# Patient Record
Sex: FEMALE | Race: WHITE | NOT HISPANIC OR LATINO | Employment: FULL TIME | RURAL
[De-identification: names, ages, dates, MRNs, and addresses within clinical notes are randomized per-mention and may not be internally consistent; named-entity substitution may affect disease eponyms.]

---

## 2022-05-26 ENCOUNTER — HOSPITAL ENCOUNTER (EMERGENCY)
Facility: HOSPITAL | Age: 32
Discharge: HOME | End: 2022-05-26
Payer: MEDICAID

## 2022-05-26 VITALS
HEIGHT: 63 IN | WEIGHT: 143 LBS | OXYGEN SATURATION: 99 % | DIASTOLIC BLOOD PRESSURE: 86 MMHG | BODY MASS INDEX: 25.34 KG/M2 | RESPIRATION RATE: 17 BRPM | HEART RATE: 96 BPM | SYSTOLIC BLOOD PRESSURE: 115 MMHG

## 2022-05-26 DIAGNOSIS — M79.671 FOOT PAIN, RIGHT: ICD-10-CM

## 2022-05-26 DIAGNOSIS — S92.354A CLOSED NONDISPLACED FRACTURE OF FIFTH METATARSAL BONE OF RIGHT FOOT, INITIAL ENCOUNTER: Primary | ICD-10-CM

## 2022-05-26 PROCEDURE — 99283 EMERGENCY DEPT VISIT LOW MDM: CPT

## 2022-05-26 PROCEDURE — 99282 EMERGENCY DEPT VISIT SF MDM: CPT | Mod: ,,, | Performed by: NURSE PRACTITIONER

## 2022-05-26 PROCEDURE — 99282 PR EMERGENCY DEPT VISIT,LEVEL II: ICD-10-PCS | Mod: ,,, | Performed by: NURSE PRACTITIONER

## 2022-05-26 RX ORDER — QUETIAPINE FUMARATE 100 MG/1
TABLET, FILM COATED ORAL
COMMUNITY
End: 2023-09-20

## 2022-05-26 RX ORDER — DULOXETIN HYDROCHLORIDE 60 MG/1
CAPSULE, DELAYED RELEASE ORAL
COMMUNITY
End: 2023-09-20

## 2022-05-26 RX ORDER — ROPINIROLE 2 MG/1
2 TABLET, FILM COATED ORAL NIGHTLY
COMMUNITY
End: 2023-09-20

## 2022-05-26 NOTE — DISCHARGE INSTRUCTIONS
Wear splint. Use crutches as directed. Elevate Right leg.   Apply ice every 2-3 hours.   May take Tylenol or Ibuprofen for pain.   Follow up with Dr. Costa in clinic next week. Call for appointment.

## 2022-05-26 NOTE — ED NOTES
Medium walking boot applied to Right foot. Crutches fitted and patient demonstrated proper use with ambulation.

## 2022-05-26 NOTE — Clinical Note
"Zahra Pateljanel Cifuentes was seen and treated in our emergency department on 5/26/2022.  She may return to work on 05/31/2022.  If ok with DR. Costa     If you have any questions or concerns, please don't hesitate to call.      SEGRIO Diallo"

## 2022-05-26 NOTE — ED PROVIDER NOTES
"Encounter Date: 5/26/2022       History     Chief Complaint   Patient presents with    Foot Injury     31 year old  female presents to ED with right foot pain. Pt reports approximately 30-45 minutes ago she went to stand up and fell, her right foot "rolled in". Denies ankle pain. + pulses right foot. + swelling right lateral foot, TTP.       Injury   The incident occurred just prior to arrival. The injury mechanism was a fall. She came to the ER via ambulatory. There is an injury to the right foot. The pain is at a severity of 7/10.     Review of patient's allergies indicates:   Allergen Reactions    Penicillins      Other reaction(s): Unknown     Past Medical History:   Diagnosis Date    Bipolar disorder, unspecified      Past Surgical History:   Procedure Laterality Date    TUBAL LIGATION Bilateral      History reviewed. No pertinent family history.  Social History     Tobacco Use    Smoking status: Current Every Day Smoker     Packs/day: 1.00     Types: Cigarettes    Smokeless tobacco: Never Used   Substance Use Topics    Alcohol use: Not Currently    Drug use: Yes     Types: Marijuana     Review of Systems   Constitutional: Negative.    Respiratory: Negative.    Cardiovascular: Negative.    Musculoskeletal: Positive for joint swelling.       Physical Exam     Initial Vitals [05/26/22 1742]   BP Pulse Resp Temp SpO2   115/86 96 17 -- 99 %      MAP       --         Physical Exam    Constitutional: She appears well-developed and well-nourished. No distress.   HENT:   Head: Normocephalic and atraumatic.   Neck:   Normal range of motion.  Cardiovascular: Normal rate, regular rhythm, normal heart sounds and intact distal pulses.   Pulmonary/Chest: Breath sounds normal. No respiratory distress.   Musculoskeletal:      Cervical back: Normal range of motion.      Right foot: Normal capillary refill. Swelling and tenderness present. Normal pulse.     Neurological: She is alert and oriented to person, " place, and time. She has normal strength.   Skin: Skin is warm and dry. Capillary refill takes less than 2 seconds.   Psychiatric: She has a normal mood and affect.         Medical Screening Exam   See Full Note    ED Course   Procedures  Labs Reviewed - No data to display       Imaging Results          X-Ray Foot Complete Right (Final result)  Result time 05/26/22 17:53:47    Final result by Emmett Turcios DO (05/26/22 17:53:47)                 Impression:      Acute nondisplaced fracture base of the 5th metatarsal      Electronically signed by: Emmett Turcios  Date:    05/26/2022  Time:    17:53             Narrative:    EXAMINATION:  XR FOOT COMPLETE 3 VIEW RIGHT    CLINICAL HISTORY:  Pain in right foot    TECHNIQUE:  XR FOOT COMPLETE 3 VIEW RIGHT    COMPARISON:  Comparisons were reviewed, if available.    FINDINGS:  Acute nondisplaced fracture base of the 5th metatarsal                                 Medications - No data to display              ED Course as of 05/26/22 1812   Thu May 26, 2022   1805 Discussed care with Dr. Costa ortho on call. Will place in boot and have follow up in clinic next week.  [CG]      ED Course User Index  [CG] SERGIO Diallo          Clinical Impression:   Final diagnoses:  [M79.671] Foot pain, right  [S92.354A] Closed nondisplaced fracture of fifth metatarsal bone of right foot, initial encounter (Primary)          ED Disposition Condition    Discharge Stable        ED Prescriptions     None        Follow-up Information     Follow up With Specialties Details Why Contact Info    Estuardo Costa MD Orthopedic Surgery Go in 3 days  [address]  [address]  Estuardo Costa Md, Orthopedic & Sports Medicine, [facility] [address]  [phone]             SERGIO Diallo  05/26/22 1813

## 2022-05-26 NOTE — ED TRIAGE NOTES
Presents to ER with reports of Right foot injury. Patient states she went to stand up and foot turned in. Knot and edema to outer foot noted. Ice pack place on triage.

## 2023-09-20 ENCOUNTER — HOSPITAL ENCOUNTER (EMERGENCY)
Facility: HOSPITAL | Age: 33
Discharge: HOME | End: 2023-09-20
Payer: MEDICAID

## 2023-09-20 VITALS
OXYGEN SATURATION: 100 % | SYSTOLIC BLOOD PRESSURE: 140 MMHG | HEART RATE: 91 BPM | RESPIRATION RATE: 20 BRPM | WEIGHT: 121 LBS | TEMPERATURE: 99 F | BODY MASS INDEX: 21.44 KG/M2 | HEIGHT: 63 IN | DIASTOLIC BLOOD PRESSURE: 92 MMHG

## 2023-09-20 DIAGNOSIS — N30.00 ACUTE CYSTITIS WITHOUT HEMATURIA: ICD-10-CM

## 2023-09-20 DIAGNOSIS — B96.89 BV (BACTERIAL VAGINOSIS): Primary | ICD-10-CM

## 2023-09-20 DIAGNOSIS — N76.0 BV (BACTERIAL VAGINOSIS): Primary | ICD-10-CM

## 2023-09-20 DIAGNOSIS — Z20.2 EXPOSURE TO STD: ICD-10-CM

## 2023-09-20 DIAGNOSIS — R03.0 ELEVATED BLOOD PRESSURE READING WITHOUT DIAGNOSIS OF HYPERTENSION: ICD-10-CM

## 2023-09-20 LAB
BACTERIA #/AREA URNS HPF: ABNORMAL /HPF
BACTERIA VAG QL WET PREP: ABNORMAL /HPF
BILIRUB UR QL STRIP: NEGATIVE
CLARITY UR: ABNORMAL
CLUE CELLS VAG QL WET PREP: ABNORMAL /HPF
COLOR UR: YELLOW
GLUCOSE UR STRIP-MCNC: NEGATIVE MG/DL
HCG UR QL IA.RAPID: NEGATIVE
KETONES UR STRIP-SCNC: NEGATIVE MG/DL
LEUKOCYTE ESTERASE UR QL STRIP: ABNORMAL
NITRITE UR QL STRIP: NEGATIVE
PH UR STRIP: 7 PH UNITS
PROT UR QL STRIP: NEGATIVE
RBC # UR STRIP: NEGATIVE /UL
RBC #/AREA URNS HPF: ABNORMAL /HPF
RBC #/AREA VAG WET PREP: ABNORMAL /HPF
SP GR UR STRIP: 1.01
SQUAMOUS #/AREA URNS LPF: ABNORMAL /LPF
SQUAMOUS EPITHELIALS WET WOUNT, GENITAL: ABNORMAL /HPF
T VAGINALIS VAG QL WET PREP: ABNORMAL /HPF
UROBILINOGEN UR STRIP-ACNC: 0.2 MG/DL
WBC #/AREA URNS HPF: ABNORMAL /HPF
WBC CLUMPS WET MOUNT, GENITAL: ABNORMAL /HPF
WBC VAG QL WET PREP: ABNORMAL /HPF
YEAST VAG QL WET PREP: ABNORMAL /HPF

## 2023-09-20 PROCEDURE — 99284 EMERGENCY DEPT VISIT MOD MDM: CPT

## 2023-09-20 PROCEDURE — 99284 EMERGENCY DEPT VISIT MOD MDM: CPT | Mod: ,,, | Performed by: NURSE PRACTITIONER

## 2023-09-20 PROCEDURE — 87491 CHLMYD TRACH DNA AMP PROBE: CPT | Performed by: NURSE PRACTITIONER

## 2023-09-20 PROCEDURE — 96372 THER/PROPH/DIAG INJ SC/IM: CPT | Performed by: NURSE PRACTITIONER

## 2023-09-20 PROCEDURE — 87086 URINE CULTURE/COLONY COUNT: CPT | Performed by: NURSE PRACTITIONER

## 2023-09-20 PROCEDURE — 81025 URINE PREGNANCY TEST: CPT | Performed by: NURSE PRACTITIONER

## 2023-09-20 PROCEDURE — 87210 SMEAR WET MOUNT SALINE/INK: CPT | Performed by: NURSE PRACTITIONER

## 2023-09-20 PROCEDURE — 81001 URINALYSIS AUTO W/SCOPE: CPT | Performed by: NURSE PRACTITIONER

## 2023-09-20 PROCEDURE — 99284 PR EMERGENCY DEPT VISIT,LEVEL IV: ICD-10-PCS | Mod: ,,, | Performed by: NURSE PRACTITIONER

## 2023-09-20 PROCEDURE — 87591 N.GONORRHOEAE DNA AMP PROB: CPT | Performed by: NURSE PRACTITIONER

## 2023-09-20 PROCEDURE — 63600175 PHARM REV CODE 636 W HCPCS: Performed by: NURSE PRACTITIONER

## 2023-09-20 RX ORDER — CEFTRIAXONE 1 G/1
1 INJECTION, POWDER, FOR SOLUTION INTRAMUSCULAR; INTRAVENOUS
Status: COMPLETED | OUTPATIENT
Start: 2023-09-20 | End: 2023-09-20

## 2023-09-20 RX ORDER — CEPHALEXIN 500 MG/1
500 CAPSULE ORAL EVERY 12 HOURS
Qty: 14 CAPSULE | Refills: 0 | Status: SHIPPED | OUTPATIENT
Start: 2023-09-20 | End: 2023-09-27

## 2023-09-20 RX ADMIN — CEFTRIAXONE 1 G: 1 INJECTION, POWDER, FOR SOLUTION INTRAMUSCULAR; INTRAVENOUS at 02:09

## 2023-09-20 NOTE — Clinical Note
"Zahra Pateljanel Cifuentes was seen and treated in our emergency department on 9/20/2023.  She may return to work on 09/21/2023.       If you have any questions or concerns, please don't hesitate to call.      Jen Goldman, MANDYP"

## 2023-09-20 NOTE — DISCHARGE INSTRUCTIONS
Take all of your antibiotics as prescribed. No sexual activity until antibiotics have been completed and partner has been tested for sexually transmitted disease. Follow up with your primary care provider or health department for further STD testing and for retesting as needed. Use condoms with every sexual encounter. Return to the ER for worsening symptoms.     The examination and treatment you have received in the Emergency Department today have been rendered on an emergency basis only and are not intended to be a substitute for an effort to provide complete medical care. You should contact your follow-up physician as it is important that you let him or her check you and report any new or remaining problems since it is impossible to recognize and treat all elements of an injury or illness in a single emergency care center visit.

## 2023-09-20 NOTE — ED NOTES
Patient has no signs of adverse reaction noted to abt, discharge instructions given with voiced understanding.PATRICK

## 2023-09-20 NOTE — ED PROVIDER NOTES
Encounter Date: 9/20/2023       History     Chief Complaint   Patient presents with    Vaginal Discharge    Vaginal Itching     32 yo WF presents with c/o vaginal irritation and discharge since having unprotected sex on Sunday.     The history is provided by the patient.     Review of patient's allergies indicates:   Allergen Reactions    Penicillins      Other reaction(s): Unknown     Past Medical History:   Diagnosis Date    Bipolar disorder, unspecified      Past Surgical History:   Procedure Laterality Date    DENTAL SURGERY      TUBAL LIGATION Bilateral      History reviewed. No pertinent family history.  Social History     Tobacco Use    Smoking status: Every Day     Current packs/day: 1.00     Types: Cigarettes    Smokeless tobacco: Never   Substance Use Topics    Alcohol use: Not Currently    Drug use: Not Currently     Review of Systems   Constitutional:  Negative for appetite change, chills and fever.   Respiratory:  Negative for cough, shortness of breath and wheezing.    Gastrointestinal:  Negative for abdominal pain, diarrhea, nausea and vomiting.   Genitourinary:  Positive for pelvic pain and vaginal discharge.   Musculoskeletal:  Negative for back pain.   Neurological:  Negative for dizziness, weakness and headaches.   Psychiatric/Behavioral:  Negative for confusion.    All other systems reviewed and are negative.      Physical Exam     Initial Vitals [09/20/23 0145]   BP Pulse Resp Temp SpO2   (!) 148/91 97 20 99.5 °F (37.5 °C) 100 %      MAP       --         Physical Exam    Nursing note and vitals reviewed.  Constitutional: She appears well-developed and well-nourished.   HENT:   Head: Normocephalic and atraumatic.   Right Ear: External ear normal.   Left Ear: External ear normal.   Nose: Nose normal.   Mouth/Throat: Oropharynx is clear and moist.   Eyes: EOM are normal. Pupils are equal, round, and reactive to light.   Neck: Neck supple.   Normal range of motion.  Cardiovascular:  Normal rate,  regular rhythm and normal heart sounds.           Pulmonary/Chest: Breath sounds normal.   Abdominal: Abdomen is soft. Bowel sounds are normal.   Genitourinary:    Vagina, uterus and rectum normal.   There is no rash, tenderness or lesion on the right labia. There is no rash, tenderness or lesion on the left labia. Cervix exhibits discharge. Cervix exhibits no motion tenderness and no friability. Right adnexum displays no tenderness. Left adnexum displays no tenderness.   Musculoskeletal:         General: Normal range of motion.      Cervical back: Normal range of motion and neck supple.     Neurological: She is alert and oriented to person, place, and time. She has normal strength. GCS score is 15. GCS eye subscore is 4. GCS verbal subscore is 5. GCS motor subscore is 6.   Skin: Skin is warm. Capillary refill takes less than 2 seconds.   Psychiatric: She has a normal mood and affect. Her behavior is normal. Judgment and thought content normal.         Medical Screening Exam   See Full Note    ED Course   Procedures  Labs Reviewed   WET PREP, GENITAL - Abnormal; Notable for the following components:       Result Value    WBC Wet Mount Few (*)     Bacteria Wet Mount Moderate (*)     Clue Cell Wet Mount Moderate (*)     Squamous Epithelials Wet Mount Rare (*)     All other components within normal limits   URINALYSIS, REFLEX TO URINE CULTURE - Abnormal; Notable for the following components:    Leukocytes, UA Large (*)     All other components within normal limits   URINALYSIS, MICROSCOPIC - Abnormal; Notable for the following components:    WBC, UA 15-25 (*)     Bacteria, UA Moderate (*)     Squamous Epithelial Cells, UA Few (*)     All other components within normal limits   HCG QUALITATIVE URINE - Normal   CHLAMYDIA/GONORRHOEAE(GC), PCR   CULTURE, URINE          Imaging Results    None          Medications   cefTRIAXone injection 1 g (has no administration in time range)     Medical Decision Making  34 yo WF  presents with c/o vaginal irritation and discharge since having unprotected sex on Sunday.   Reports that she is prone to BV. Has also had gonorrhea in the past and states this feels similar.   Vaginal exam performed, witnessed by DARLENE Almeida - no CMT or adnexal tenderness. Does have thick discharge from cervix.   Urinalysis with 15-25 WBC, moderate bacteria and large leukocytes.   Wet prep with moderate clue cells, moderate bacteria  GC Chlamydia pending  1g Rocephin given IM in ED  Discharged with rx for Keflex BID x 7 days which will treat UTI, BV and GC/Chlamydia if it comes back positive.   Patient educated on importance of condom use. Avoid sexual activity until she has finished abx. Partner should be checked prior to sexual activity.     Strict return and follow-up precautions have been given by me personally to the patient/family/caregiver(s).    Data Reviewed/Counseling: I have reviewed the patient's vital signs, nursing notes, and other relevant tests/information. I had a detailed discussion regarding the historical points, exam findings, and any diagnostic results supporting the discharge diagnosis. I also discussed the need for outpatient follow-up and the need to return to the ED if symptoms worsen or if there are any questions or concerns that arise at home.          Amount and/or Complexity of Data Reviewed  External Data Reviewed: notes.  Labs: ordered. Decision-making details documented in ED Course.    Risk  Prescription drug management.               ED Course as of 09/20/23 0216   Wed Sep 20, 2023   0204 Leukocytes, UA(!): Large [MJ]   0204 WBC, UA(!): 15-25 [MJ]   0204 Bacteria, UA(!): Moderate [MJ]   0212 WBC - Vaginal Screen(!): Few [MJ]   0212 Bacteria - Vaginal Screen(!): Moderate [MJ]   0212 Clue Cells, Wet Prep(!): Moderate [MJ]      ED Course User Index  [MJ] Jen Goldman, SERGIO                    Clinical Impression:   Final diagnoses:  [R03.0] Elevated blood pressure reading without  diagnosis of hypertension  [N30.00] Acute cystitis without hematuria  [N76.0, B96.89] BV (bacterial vaginosis) (Primary)  [Z20.2] Exposure to STD               Jen Goldman, [facility]  09/20/23 0216

## 2023-09-20 NOTE — ED TRIAGE NOTES
Pt presents to the ED with complaints of vaginal itching, white vaginal discharge and vaginal discomfort. Pt states she had sex with a hi on Sunday and ever since she has been have these symptoms. Pt states she is scared she may have a std.

## 2023-09-21 LAB
CHLAMYDIA BY PCR: NEGATIVE
N. GONORRHOEAE (GC) BY PCR: NEGATIVE

## 2023-09-22 LAB — UA COMPLETE W REFLEX CULTURE PNL UR: NORMAL

## 2023-11-02 ENCOUNTER — HOSPITAL ENCOUNTER (EMERGENCY)
Facility: HOSPITAL | Age: 33
Discharge: HOME | End: 2023-11-02
Payer: MEDICAID

## 2023-11-02 VITALS
HEIGHT: 62 IN | SYSTOLIC BLOOD PRESSURE: 114 MMHG | DIASTOLIC BLOOD PRESSURE: 68 MMHG | WEIGHT: 125 LBS | RESPIRATION RATE: 20 BRPM | HEART RATE: 84 BPM | BODY MASS INDEX: 23 KG/M2 | TEMPERATURE: 98 F | OXYGEN SATURATION: 99 %

## 2023-11-02 DIAGNOSIS — R51.9 SINUS HEADACHE: ICD-10-CM

## 2023-11-02 DIAGNOSIS — J32.9 SINUSITIS, UNSPECIFIED CHRONICITY, UNSPECIFIED LOCATION: ICD-10-CM

## 2023-11-02 DIAGNOSIS — J02.9 SORE THROAT: Primary | ICD-10-CM

## 2023-11-02 DIAGNOSIS — R05.9 COUGH, UNSPECIFIED TYPE: ICD-10-CM

## 2023-11-02 PROCEDURE — 99284 EMERGENCY DEPT VISIT MOD MDM: CPT

## 2023-11-02 PROCEDURE — 99284 EMERGENCY DEPT VISIT MOD MDM: CPT | Mod: ,,,

## 2023-11-02 PROCEDURE — 96372 THER/PROPH/DIAG INJ SC/IM: CPT

## 2023-11-02 PROCEDURE — 99284 PR EMERGENCY DEPT VISIT,LEVEL IV: ICD-10-PCS | Mod: ,,,

## 2023-11-02 PROCEDURE — 63600175 PHARM REV CODE 636 W HCPCS

## 2023-11-02 RX ORDER — AZITHROMYCIN 250 MG/1
TABLET, FILM COATED ORAL
Qty: 6 TABLET | Refills: 0 | Status: SHIPPED | OUTPATIENT
Start: 2023-11-02 | End: 2023-11-07

## 2023-11-02 RX ORDER — KETOROLAC TROMETHAMINE 30 MG/ML
30 INJECTION, SOLUTION INTRAMUSCULAR; INTRAVENOUS
Status: COMPLETED | OUTPATIENT
Start: 2023-11-02 | End: 2023-11-02

## 2023-11-02 RX ORDER — DEXAMETHASONE SODIUM PHOSPHATE 4 MG/ML
4 INJECTION, SOLUTION INTRA-ARTICULAR; INTRALESIONAL; INTRAMUSCULAR; INTRAVENOUS; SOFT TISSUE
Status: COMPLETED | OUTPATIENT
Start: 2023-11-02 | End: 2023-11-02

## 2023-11-02 RX ADMIN — KETOROLAC TROMETHAMINE 30 MG: 30 INJECTION, SOLUTION INTRAMUSCULAR; INTRAVENOUS at 09:11

## 2023-11-02 RX ADMIN — DEXAMETHASONE SODIUM PHOSPHATE 4 MG: 4 INJECTION, SOLUTION INTRA-ARTICULAR; INTRALESIONAL; INTRAMUSCULAR; INTRAVENOUS; SOFT TISSUE at 09:11

## 2023-11-02 NOTE — Clinical Note
"Zahra Pateljanel Cifuentes was seen and treated in our emergency department on 11/2/2023.  She may return to work on 11/03/2023.       If you have any questions or concerns, please don't hesitate to call.      Kenneth Walden, SERGIO"

## 2023-11-03 NOTE — DISCHARGE INSTRUCTIONS
Take medication as directed by the label on the bottle, follow up with local pcp if symptoms continue, return to the ER ONLY if symptoms worsen.     The examination and treatment you have received in the Emergency Department today have been rendered on an emergency basis only and are not intended to be a substitute for an effort to provide complete medical care. You should contact your follow-up physician as it is important that you let him or her check you and report any new or remaining problems since it is impossible to recognize and treat all elements of an injury or illness in a single emergency care center visit.

## 2023-11-03 NOTE — ED PROVIDER NOTES
Encounter Date: 11/2/2023       History     Chief Complaint   Patient presents with    Headache    Sore Throat     Patient is a 34 y/o  female who presents to the Emergency Department with c/o cold symptoms. Patient stated that her current symptoms have been ongoing for the past one week. Patient endorses cough, sore throat, but denies any fevers. Patient has not taken any over counter medication.     The history is provided by the patient.     Review of patient's allergies indicates:   Allergen Reactions    Penicillins      Other reaction(s): Unknown     Past Medical History:   Diagnosis Date    Bipolar disorder, unspecified      Past Surgical History:   Procedure Laterality Date    DENTAL SURGERY      TUBAL LIGATION Bilateral      History reviewed. No pertinent family history.  Social History     Tobacco Use    Smoking status: Every Day     Current packs/day: 1.00     Types: Cigarettes    Smokeless tobacco: Never   Substance Use Topics    Alcohol use: Not Currently    Drug use: Not Currently     Review of Systems   Constitutional: Negative.    HENT:  Positive for congestion and sore throat.    Eyes: Negative.    Respiratory:  Positive for cough.    Cardiovascular: Negative.    Gastrointestinal: Negative.    Endocrine: Negative.    Genitourinary: Negative.    Musculoskeletal: Negative.    Skin: Negative.    Allergic/Immunologic: Negative.    Neurological:  Positive for headaches.   Hematological: Negative.    Psychiatric/Behavioral: Negative.         Physical Exam     Initial Vitals   BP Pulse Resp Temp SpO2   11/02/23 2042 11/02/23 2047 11/02/23 2042 11/02/23 2042 11/02/23 2042   128/73 95 (!) 90 98 °F (36.7 °C) 99 %      MAP       --                Physical Exam    Nursing note and vitals reviewed.  Constitutional: Vital signs are normal. She appears well-developed and well-nourished. She is not diaphoretic. She is cooperative.  Non-toxic appearance. She has a sickly appearance. She does not appear ill.  No distress.   HENT:   Head: Normocephalic and atraumatic.   Right Ear: Hearing, external ear and ear canal normal. Tympanic membrane is bulging.   Left Ear: Hearing, external ear and ear canal normal. Tympanic membrane is bulging. No decreased hearing is noted.   Nose: Nose normal. Right sinus exhibits no maxillary sinus tenderness and no frontal sinus tenderness. Left sinus exhibits no maxillary sinus tenderness and no frontal sinus tenderness.   Mouth/Throat: Uvula is midline and mucous membranes are normal. Posterior oropharyngeal edema and posterior oropharyngeal erythema present.   Neck: Trachea normal and phonation normal. Neck supple. No thyroid mass and no thyromegaly present.   Normal range of motion.   Full passive range of motion without pain.     Cardiovascular:  Normal rate, regular rhythm, S1 normal, S2 normal, normal heart sounds, intact distal pulses and normal pulses.     Exam reveals no gallop, no S3, no S4, no distant heart sounds and no friction rub.       No murmur heard.  No systolic murmur is present.  No diastolic murmur is present.  Pulmonary/Chest: Effort normal and breath sounds normal.   Musculoskeletal:      Cervical back: Full passive range of motion without pain, normal range of motion and neck supple.     Lymphadenopathy:     She has no cervical adenopathy.     She has no axillary adenopathy.   Neurological: She is alert and oriented to person, place, and time. She has normal strength and normal reflexes. She displays normal reflexes. No cranial nerve deficit or sensory deficit. She displays a negative Romberg sign. GCS eye subscore is 4. GCS verbal subscore is 5. GCS motor subscore is 6.   Skin: Skin is warm, dry and intact. Capillary refill takes less than 2 seconds. No rash noted.   Psychiatric: She has a normal mood and affect. Her speech is normal and behavior is normal. Judgment and thought content normal. Cognition and memory are normal.         Medical Screening Exam   See  Full Note    ED Course   Procedures  Labs Reviewed - No data to display       Imaging Results    None          Medications   dexAMETHasone injection 4 mg (has no administration in time range)   ketorolac injection 30 mg (has no administration in time range)     Medical Decision Making  Patient is a 32 y/o  female who presents to the Emergency Department with c/o cold symptoms. Patient stated that her current symptoms have been ongoing for the past one week. Patient endorses cough, sore throat, but denies any fevers. Patient has not taken any over counter medication.     The history is provided by the patient.       Risk  Prescription drug management.  Risk Details: Pregnancy risk while taking PO antibiotics                ED Course as of 11/02/23 2058   Thu Nov 02, 2023 2056 Discharge instructions given along with strict return precautions, patient verbalizes understanding.   [AC]      ED Course User Index  [AC] Kenneth Walden FNP                    Clinical Impression:   Final diagnoses:  [J02.9] Sore throat (Primary)  [R51.9] Sinus headache  [J32.9] Sinusitis, unspecified chronicity, unspecified location  [R05.9] Cough, unspecified type        ED Disposition Condition    Discharge Stable          ED Prescriptions       Medication Sig Dispense Start Date End Date Auth. Provider    azithromycin (Z-HARINI) 250 MG tablet Take 2 tablets by mouth on day 1; Take 1 tablet by mouth on days 2-5 6 tablet 11/2/2023 11/7/2023 Kenneth Walden FNP          Follow-up Information       Follow up With Specialties Details Why Contact Info    Miguel Alanis NP Internal Medicine  As needed, If symptoms worsen [address] [place] [id number]  [phone]               Kenneth Walden FNP  11/02/23 2057       Kenneth Walden FNP  11/02/23 2058       Kenneth Wladen FNP  11/02/23 2058

## 2023-12-11 ENCOUNTER — HOSPITAL ENCOUNTER (EMERGENCY)
Facility: HOSPITAL | Age: 33
Discharge: HOME | End: 2023-12-11
Payer: MEDICAID

## 2023-12-11 VITALS
SYSTOLIC BLOOD PRESSURE: 112 MMHG | BODY MASS INDEX: 23 KG/M2 | WEIGHT: 125 LBS | HEART RATE: 77 BPM | TEMPERATURE: 98 F | DIASTOLIC BLOOD PRESSURE: 72 MMHG | HEIGHT: 62 IN | OXYGEN SATURATION: 99 % | RESPIRATION RATE: 16 BRPM

## 2023-12-11 DIAGNOSIS — R07.9 CHEST PAIN, UNSPECIFIED TYPE: Primary | ICD-10-CM

## 2023-12-11 DIAGNOSIS — J06.9 VIRAL URI WITH COUGH: ICD-10-CM

## 2023-12-11 DIAGNOSIS — R06.02 SHORTNESS OF BREATH: ICD-10-CM

## 2023-12-11 DIAGNOSIS — R07.9 CHEST PAIN: ICD-10-CM

## 2023-12-11 LAB
ALBUMIN SERPL BCP-MCNC: 3.8 G/DL (ref 3.5–5)
ALBUMIN/GLOB SERPL: 0.9 {RATIO}
ALP SERPL-CCNC: 69 U/L (ref 37–98)
ALT SERPL W P-5'-P-CCNC: 20 U/L (ref 13–56)
AMPHET UR QL SCN: NEGATIVE
ANION GAP SERPL CALCULATED.3IONS-SCNC: 11 MMOL/L (ref 7–16)
AST SERPL W P-5'-P-CCNC: 20 U/L (ref 15–37)
BACTERIA #/AREA URNS HPF: ABNORMAL /HPF
BARBITURATES UR QL SCN: NEGATIVE
BASOPHILS # BLD AUTO: 0.02 K/UL (ref 0–0.2)
BASOPHILS NFR BLD AUTO: 0.3 % (ref 0–1)
BENZODIAZ METAB UR QL SCN: NEGATIVE
BILIRUB SERPL-MCNC: 0.2 MG/DL (ref ?–1.2)
BILIRUB UR QL STRIP: ABNORMAL
BUN SERPL-MCNC: 11 MG/DL (ref 7–18)
BUN/CREAT SERPL: 14 (ref 6–20)
CALCIUM SERPL-MCNC: 9 MG/DL (ref 8.5–10.1)
CHLORIDE SERPL-SCNC: 101 MMOL/L (ref 98–107)
CLARITY UR: ABNORMAL
CO2 SERPL-SCNC: 28 MMOL/L (ref 21–32)
COCAINE UR QL SCN: NEGATIVE
COLOR UR: ABNORMAL
CREAT SERPL-MCNC: 0.8 MG/DL (ref 0.55–1.02)
DIFFERENTIAL METHOD BLD: ABNORMAL
EGFR (NO RACE VARIABLE) (RUSH/TITUS): 100 ML/MIN/1.73M2
EOSINOPHIL # BLD AUTO: 0.05 K/UL (ref 0–0.5)
EOSINOPHIL NFR BLD AUTO: 0.7 % (ref 1–4)
ERYTHROCYTE [DISTWIDTH] IN BLOOD BY AUTOMATED COUNT: 12.2 % (ref 11.5–14.5)
GLOBULIN SER-MCNC: 4.1 G/DL (ref 2–4)
GLUCOSE SERPL-MCNC: 114 MG/DL (ref 74–106)
GLUCOSE UR STRIP-MCNC: NEGATIVE MG/DL
HCT VFR BLD AUTO: 41.4 % (ref 38–47)
HGB BLD-MCNC: 13.8 G/DL (ref 12–16)
KETONES UR STRIP-SCNC: 15 MG/DL
LACTATE SERPL-SCNC: 1.5 MMOL/L (ref 0.4–2)
LEUKOCYTE ESTERASE UR QL STRIP: NEGATIVE
LYMPHOCYTES # BLD AUTO: 1.81 K/UL (ref 1–4.8)
LYMPHOCYTES NFR BLD AUTO: 24.8 % (ref 27–41)
MAGNESIUM SERPL-MCNC: 2.1 MG/DL (ref 1.7–2.3)
MCH RBC QN AUTO: 32 PG (ref 27–31)
MCHC RBC AUTO-ENTMCNC: 33.3 G/DL (ref 32–36)
MCV RBC AUTO: 96.1 FL (ref 80–96)
MDA UR QL SCN: NEGATIVE
METHADONE UR QL SCN: NEGATIVE
METHAMPHET UR QL SCN: NEGATIVE
MONOCYTES # BLD AUTO: 0.54 K/UL (ref 0–0.8)
MONOCYTES NFR BLD AUTO: 7.4 % (ref 2–6)
MPC BLD CALC-MCNC: 9.6 FL (ref 9.4–12.4)
NEUTROPHILS # BLD AUTO: 4.89 K/UL (ref 1.8–7.7)
NEUTROPHILS NFR BLD AUTO: 66.8 % (ref 53–65)
NITRITE UR QL STRIP: NEGATIVE
OPIATES UR QL SCN: NEGATIVE
OXYCODONE UR QL SCN: NEGATIVE
PCP UR QL SCN: NEGATIVE
PH UR STRIP: 5.5 PH UNITS
PLATELET # BLD AUTO: 257 K/UL (ref 150–400)
POTASSIUM SERPL-SCNC: 4.1 MMOL/L (ref 3.5–5.1)
PROT SERPL-MCNC: 7.9 G/DL (ref 6.4–8.2)
PROT UR QL STRIP: 30
RBC # BLD AUTO: 4.31 M/UL (ref 4.2–5.4)
RBC # UR STRIP: ABNORMAL /UL
RBC #/AREA URNS HPF: ABNORMAL /HPF
SODIUM SERPL-SCNC: 136 MMOL/L (ref 136–145)
SP GR UR STRIP: >=1.03
SQUAMOUS #/AREA URNS LPF: ABNORMAL /LPF
THC UR QL SCN: POSITIVE
TRICYCLICS UR QL SCN: NEGATIVE
TROPONIN I SERPL DL<=0.01 NG/ML-MCNC: <4 PG/ML
UROBILINOGEN UR STRIP-ACNC: 1 MG/DL
WBC # BLD AUTO: 7.31 K/UL (ref 4.5–11)
WBC #/AREA URNS HPF: ABNORMAL /HPF

## 2023-12-11 PROCEDURE — 93005 ELECTROCARDIOGRAM TRACING: CPT

## 2023-12-11 PROCEDURE — 93010 EKG 12-LEAD: ICD-10-PCS | Mod: ,,, | Performed by: INTERNAL MEDICINE

## 2023-12-11 PROCEDURE — 99284 EMERGENCY DEPT VISIT MOD MDM: CPT | Mod: ,,,

## 2023-12-11 PROCEDURE — 87086 URINE CULTURE/COLONY COUNT: CPT

## 2023-12-11 PROCEDURE — 84484 ASSAY OF TROPONIN QUANT: CPT

## 2023-12-11 PROCEDURE — 83735 ASSAY OF MAGNESIUM: CPT

## 2023-12-11 PROCEDURE — 99284 PR EMERGENCY DEPT VISIT,LEVEL IV: ICD-10-PCS | Mod: ,,,

## 2023-12-11 PROCEDURE — 63600175 PHARM REV CODE 636 W HCPCS

## 2023-12-11 PROCEDURE — 93010 ELECTROCARDIOGRAM REPORT: CPT | Mod: ,,, | Performed by: INTERNAL MEDICINE

## 2023-12-11 PROCEDURE — 80053 COMPREHEN METABOLIC PANEL: CPT

## 2023-12-11 PROCEDURE — 96372 THER/PROPH/DIAG INJ SC/IM: CPT

## 2023-12-11 PROCEDURE — 83605 ASSAY OF LACTIC ACID: CPT

## 2023-12-11 PROCEDURE — 81001 URINALYSIS AUTO W/SCOPE: CPT | Mod: 59

## 2023-12-11 PROCEDURE — 99285 EMERGENCY DEPT VISIT HI MDM: CPT | Mod: 25

## 2023-12-11 PROCEDURE — 80305 DRUG TEST PRSMV DIR OPT OBS: CPT

## 2023-12-11 PROCEDURE — 85025 COMPLETE CBC W/AUTO DIFF WBC: CPT

## 2023-12-11 PROCEDURE — 25000003 PHARM REV CODE 250

## 2023-12-11 RX ORDER — ASPIRIN 325 MG
325 TABLET ORAL
Status: COMPLETED | OUTPATIENT
Start: 2023-12-11 | End: 2023-12-11

## 2023-12-11 RX ORDER — ALBUTEROL SULFATE 90 UG/1
AEROSOL, METERED RESPIRATORY (INHALATION)
COMMUNITY
Start: 2023-12-01

## 2023-12-11 RX ORDER — KETOROLAC TROMETHAMINE 30 MG/ML
30 INJECTION, SOLUTION INTRAMUSCULAR; INTRAVENOUS
Status: COMPLETED | OUTPATIENT
Start: 2023-12-11 | End: 2023-12-11

## 2023-12-11 RX ORDER — DEXAMETHASONE SODIUM PHOSPHATE 4 MG/ML
4 INJECTION, SOLUTION INTRA-ARTICULAR; INTRALESIONAL; INTRAMUSCULAR; INTRAVENOUS; SOFT TISSUE
Status: COMPLETED | OUTPATIENT
Start: 2023-12-11 | End: 2023-12-11

## 2023-12-11 RX ADMIN — DEXAMETHASONE SODIUM PHOSPHATE 4 MG: 4 INJECTION, SOLUTION INTRA-ARTICULAR; INTRALESIONAL; INTRAMUSCULAR; INTRAVENOUS; SOFT TISSUE at 08:12

## 2023-12-11 RX ADMIN — ASPIRIN 325 MG ORAL TABLET 325 MG: 325 PILL ORAL at 07:12

## 2023-12-11 RX ADMIN — KETOROLAC TROMETHAMINE 30 MG: 30 INJECTION, SOLUTION INTRAMUSCULAR; INTRAVENOUS at 08:12

## 2023-12-11 NOTE — Clinical Note
"Zahra Pateljanel Cifuentes was seen and treated in our emergency department on 12/11/2023.  She may return to work on 12/12/2023.       If you have any questions or concerns, please don't hesitate to call.      Kenneth Walden, SERGIO"

## 2023-12-12 NOTE — ED PROVIDER NOTES
Encounter Date: 12/11/2023       History     Chief Complaint   Patient presents with    Shortness of Breath    Chest Pain     PT USING INHALER PTA. HURTS TO BREATHE. % ORA     Patient is a 32 y/o  female with no significant PMHx presents to the Emergency Department POV from work with c/o chest tightness.  Patient stated that her current symptoms started at 3:15 p.m. this afternoon while she was at work, was given a rescue inhaler by her PCP due to her allergies.  Patient stated that she use the rescue inhaler but it did not improve her chest tightness.  Patient denies any radiation of pain, nausea, vomiting, diarrhea, fever and or exposure to ill contacts.    The history is provided by the patient.   Shortness of Breath  This is a new problem. The average episode lasts 4 hours. The problem occurs intermittently.The problem has not changed since onset.Associated symptoms include cough and chest pain. Pertinent negatives include no fever, no headaches, no coryza, no rhinorrhea, no sore throat, no swollen glands, no ear pain, no neck pain, no sputum production, no hemoptysis, no wheezing, no PND, no orthopnea, no syncope, no vomiting, no abdominal pain, no rash, no leg pain, no leg swelling and no claudication. The problem's precipitants include occupational exposure. She has tried beta-agonist inhalers for the symptoms. The treatment provided mild relief.   Chest Pain  Primary symptoms include shortness of breath and cough. Pertinent negatives for primary symptoms include no fever, no syncope, no wheezing, no abdominal pain and no vomiting.   Pertinent negatives for associated symptoms include no claudication and no orthopnea.     Review of patient's allergies indicates:   Allergen Reactions    Penicillins      Other reaction(s): Unknown     Past Medical History:   Diagnosis Date    Bipolar disorder, unspecified      Past Surgical History:   Procedure Laterality Date    DENTAL SURGERY      TUBAL  LIGATION Bilateral      History reviewed. No pertinent family history.  Social History     Tobacco Use    Smoking status: Every Day     Current packs/day: 1.00     Types: Cigarettes    Smokeless tobacco: Never   Substance Use Topics    Alcohol use: Not Currently    Drug use: Not Currently     Review of Systems   Constitutional:  Negative for fever.   HENT:  Negative for ear pain, rhinorrhea and sore throat.    Eyes: Negative.    Respiratory:  Positive for cough and shortness of breath. Negative for hemoptysis, sputum production and wheezing.    Cardiovascular:  Positive for chest pain. Negative for orthopnea, claudication, leg swelling, syncope and PND.   Gastrointestinal:  Negative for abdominal pain and vomiting.   Endocrine: Negative.    Genitourinary: Negative.    Musculoskeletal: Negative.  Negative for neck pain.   Skin:  Negative for rash.   Allergic/Immunologic: Negative.    Neurological:  Negative for headaches.   Hematological: Negative.    Psychiatric/Behavioral: Negative.         Physical Exam     Initial Vitals [12/11/23 1924]   BP Pulse Resp Temp SpO2   126/85 89 16 97.6 °F (36.4 °C) 100 %      MAP       --         Physical Exam    Nursing note and vitals reviewed.  Constitutional: Vital signs are normal. She appears well-developed and well-nourished. She is not diaphoretic. She is cooperative.  Non-toxic appearance. She does not have a sickly appearance. She does not appear ill. No distress.   Cardiovascular:  Normal rate, regular rhythm, S1 normal, S2 normal, intact distal pulses and normal pulses.     Exam reveals no gallop, no S3, no S4, no distant heart sounds and no friction rub.       No murmur heard.  No systolic murmur is present.  No diastolic murmur is present.  Pulmonary/Chest: Effort normal and breath sounds normal.   Abdominal: Abdomen is soft and flat. Bowel sounds are normal. There is no abdominal tenderness. No hernia.     Lymphadenopathy:     She has no cervical adenopathy.     She  has no axillary adenopathy.   Neurological: She is alert and oriented to person, place, and time. She has normal strength and normal reflexes. She displays normal reflexes. No cranial nerve deficit or sensory deficit. She displays a negative Romberg sign. GCS eye subscore is 4. GCS verbal subscore is 5. GCS motor subscore is 6.   Skin: Skin is warm, dry and intact. Capillary refill takes less than 2 seconds. No rash noted.   Psychiatric: She has a normal mood and affect. Her speech is normal and behavior is normal. Judgment and thought content normal. She is not actively hallucinating. Cognition and memory are normal. She is attentive.         Medical Screening Exam   See Full Note    ED Course   Procedures  Labs Reviewed   COMPREHENSIVE METABOLIC PANEL - Abnormal; Notable for the following components:       Result Value    Glucose 114 (*)     Globulin 4.1 (*)     All other components within normal limits   URINALYSIS, REFLEX TO URINE CULTURE - Abnormal; Notable for the following components:    Protein, UA 30 (*)     Ketones, UA 15 (*)     Bilirubin, UA Small (*)     Blood, UA Trace-Intact (*)     Specific Gravity, UA >=1.030 (*)     All other components within normal limits   DRUG SCREEN, URINE (BEAKER) - Abnormal; Notable for the following components:    Marijuana (THC), Urine Positive (*)     All other components within normal limits   CBC WITH DIFFERENTIAL - Abnormal; Notable for the following components:    MCV 96.1 (*)     MCH 32.0 (*)     Neutrophils % 66.8 (*)     Lymphocytes % 24.8 (*)     Monocytes % 7.4 (*)     Eosinophils % 0.7 (*)     All other components within normal limits   URINALYSIS, MICROSCOPIC - Abnormal; Notable for the following components:    WBC, UA 15-25 (*)     Bacteria, UA Moderate (*)     Squamous Epithelial Cells, UA Many (*)     All other components within normal limits   TROPONIN I - Normal   MAGNESIUM - Normal   LACTIC ACID, PLASMA - Normal   CULTURE, URINE   CBC W/ AUTO DIFFERENTIAL     Narrative:     The following orders were created for panel order CBC auto differential.  Procedure                               Abnormality         Status                     ---------                               -----------         ------                     CBC with Differential[[id number]]        Abnormal            Final result                 Please view results for these tests on the individual orders.     EKG Readings: (Independently Interpreted)   Initial Reading: No STEMI. Rhythm: Normal Sinus Rhythm. Heart Rate: 88. Ectopy: No Ectopy. Conduction: Normal. Axis: Normal.       Imaging Results              X-Ray Chest 1 View (Final result)  Result time 12/11/23 20:28:43      Final result by Micah Casarez MD (12/11/23 20:28:43)                   Impression:      No definite acute process      Electronically signed by: Micah Casarez  Date:    12/11/2023  Time:    20:28               Narrative:    EXAMINATION:  XR CHEST 1 VIEW    CLINICAL HISTORY:  .  Shortness of breath    COMPARISON:  No previous chest x-ray available    TECHNIQUE:  AP chest    FINDINGS:  Cardiac silhouette is not enlarged.  There is no mediastinal mass.  There is no pulmonary vascular engorgement.    Lungs are generally clear.  There is no gross pleural effusion.    There is no acute osseous abnormality                                    X-Rays:   Independently Interpreted Readings:   Other Readings:  Reading Physician Reading Date Result Priority  Micah Casarez MD  [phone] 12/11/2023 STAT    Narrative & Impression  EXAMINATION:  XR CHEST 1 VIEW     CLINICAL HISTORY:  .  Shortness of breath     COMPARISON:  No previous chest x-ray available     TECHNIQUE:  AP chest     FINDINGS:  Cardiac silhouette is not enlarged.  There is no mediastinal mass.  There is no pulmonary vascular engorgement.     Lungs are generally clear.  There is no gross pleural effusion.     There is no acute osseous abnormality     Impression:      No definite acute process        Electronically signed by: Micah Casarez  Date:                                            12/11/2023  Time:                                           20:28      Medications   aspirin tablet 325 mg (325 mg Oral Given 12/11/23 1947)   ketorolac injection 30 mg (30 mg Intramuscular Given 12/11/23 2032)   dexAMETHasone injection 4 mg (4 mg Intramuscular Given 12/11/23 2031)     Medical Decision Making  Patient is a 32 y/o  female with no significant PMHx presents to the Emergency Department POV from work with c/o chest tightness.  Patient stated that her current symptoms started at 3:15 p.m. this afternoon while she was at work, was given a rescue inhaler by her PCP due to her allergies.  Patient stated that she use the rescue inhaler but it did not improve her chest tightness.  Patient denies any radiation of pain, nausea, vomiting, diarrhea, fever and or exposure to ill contacts.    The history is provided by the patient.   Shortness of Breath  This is a new problem. The average episode lasts 4 hours. The problem occurs intermittently.The problem has not changed since onset.Associated symptoms include cough and chest pain. Pertinent negatives include no fever, no headaches, no coryza, no rhinorrhea, no sore throat, no swollen glands, no ear pain, no neck pain, no sputum production, no hemoptysis, no wheezing, no PND, no orthopnea, no syncope, no vomiting, no abdominal pain, no rash, no leg pain, no leg swelling and no claudication. The problem's precipitants include occupational exposure. She has tried beta-agonist inhalers for the symptoms. The treatment provided mild relief.   Chest Pain  Primary symptoms include shortness of breath and cough. Pertinent negatives for primary symptoms include no fever, no syncope, no wheezing, no abdominal pain and no vomiting.   Pertinent negatives for associated symptoms include no claudication and no orthopnea.       Amount and/or  Complexity of Data Reviewed  Labs: ordered. Decision-making details documented in ED Course.  Radiology: ordered. Decision-making details documented in ED Course.  ECG/medicine tests:  Decision-making details documented in ED Course.    Risk  OTC drugs.  Prescription drug management.      Additional MDM:   PERC Rule:   Age is greater than or equal to 50 = 0.0  Heart Rate is greater than or equal to 100 = 0.0  SaO2 on room air < 95% = 0.0  Unilateral leg swelling = 0.0  Hemoptysis = 0.0  Recent surgery or trauma = 0.0  Prior PE or DVT =  0.0  Hormone use = 0.00  PERC Score = 0      Heart Score:    History:          Slightly suspicious.  ECG:             Normal  Age:               Less than 45 years  Risk factors: no risk factors known  Troponin:       Less than or equal to normal limit  Heart Score = 0                ED Course as of 12/11/23 2033   Mon Dec 11, 2023   1952 Eosinophil %(!): 0.7 [AC]   1954 Mono %(!): 7.4 [AC]   1954 Lymph %(!): 24.8 [AC]   1954 Neutrophils Relative(!): 66.8 [AC]   1954 MCV(!): 96.1 [AC]   2008 WBC, UA(!): 15-25 [AC]   2008 Bacteria, UA(!): Moderate [AC]   2008 Squam Epithel, UA(!): Many [AC]   2008 Marijuana (THC) Metabolite(!): Positive [AC]   2008 Protein, UA(!): 30 [AC]   2008 Bilirubin (UA)(!): Small [AC]   2008 Occult Blood UA(!): Trace-Intact [AC]   2008 Specific Gravity, UA(!): >=1.030 [AC]   2024 Lab and x-ray results reviewed by me and discussed with patient, discharge instructions given along with strict return precautions patient verbalizes understanding. [AC]      ED Course User Index  [AC] Kenneth Walden FNP                           Clinical Impression:   Final diagnoses:  [R07.9] Chest pain  [R06.02] Shortness of breath  [R07.9] Chest pain, unspecified type (Primary)  [J06.9] Viral URI with cough        ED Disposition Condition    Discharge Stable          ED Prescriptions    None       Follow-up Information       Follow up With Specialties Details Why Contact Info     Miguel Alanis, NP Internal Medicine  As needed, If symptoms worsen [address] [place] [id number]  [phone]               Kenneth Walden FNP  12/11/23 2030       Kenneth Walden FNP  12/11/23 2033

## 2023-12-12 NOTE — DISCHARGE INSTRUCTIONS
Tylenol and Motrin as needed for pain, follow up with local PCP if symptoms continue, return to the emergency department if symptoms worsen.    The examination and treatment you have received in the Emergency Department today have been rendered on an emergency basis only and are not intended to be a substitute for an effort to provide complete medical care. You should contact your follow-up physician as it is important that you let him or her check you and report any new or remaining problems since it is impossible to recognize and treat all elements of an injury or illness in a single emergency care center visit.

## 2023-12-14 LAB — UA COMPLETE W REFLEX CULTURE PNL UR: NORMAL
